# Patient Record
Sex: MALE | URBAN - METROPOLITAN AREA
[De-identification: names, ages, dates, MRNs, and addresses within clinical notes are randomized per-mention and may not be internally consistent; named-entity substitution may affect disease eponyms.]

---

## 2021-06-09 PROCEDURE — 88304 TISSUE EXAM BY PATHOLOGIST: CPT | Performed by: PATHOLOGY

## 2021-06-10 ENCOUNTER — LAB REQUISITION (OUTPATIENT)
Dept: LAB | Facility: HOSPITAL | Age: 41
End: 2021-06-10
Payer: COMMERCIAL

## 2021-06-10 DIAGNOSIS — R22.2 LOCALIZED SWELLING, MASS AND LUMP, TRUNK: ICD-10-CM

## 2023-09-18 ENCOUNTER — OFFICE VISIT (OUTPATIENT)
Dept: GASTROENTEROLOGY | Facility: CLINIC | Age: 43
End: 2023-09-18
Payer: COMMERCIAL

## 2023-09-18 VITALS
DIASTOLIC BLOOD PRESSURE: 76 MMHG | SYSTOLIC BLOOD PRESSURE: 124 MMHG | WEIGHT: 190.4 LBS | BODY MASS INDEX: 26.65 KG/M2 | HEIGHT: 71 IN | TEMPERATURE: 97.8 F

## 2023-09-18 DIAGNOSIS — K76.0 HEPATIC STEATOSIS: ICD-10-CM

## 2023-09-18 DIAGNOSIS — R79.89 ABNORMAL LIVER FUNCTION TEST: Primary | ICD-10-CM

## 2023-09-18 DIAGNOSIS — R19.7 DIARRHEA, UNSPECIFIED TYPE: ICD-10-CM

## 2023-09-18 PROCEDURE — 99204 OFFICE O/P NEW MOD 45 MIN: CPT | Performed by: STUDENT IN AN ORGANIZED HEALTH CARE EDUCATION/TRAINING PROGRAM

## 2023-09-18 RX ORDER — MELATONIN
50000 DAILY
COMMUNITY

## 2023-09-18 NOTE — PATIENT INSTRUCTIONS
For patients with fatty liver disease, weight loss through diet and exercise is recommended. To MAINTAIN weight you must use up at least as many calories as you take in. To LOSE weight you must use up more calories than you take in. Start by knowing how many calories you should be eating and drinking to maintain your weight. Nutrition and calorie information on food labels is typically based on a 2,000 calorie diet. You may need fewer or more calories depending on several factors including age, gender, and level of physical activity. Increase the amount and intensity of your physical activity to match the number of calories you take in. Aim for at least 150 minutes of moderate physical activity or 75 minutes of vigorous physical activity - or an equal combination of both - each week. Regular physical activity can help you maintain your weight, keep off weight that you lose and help you reach physical and cardiovascular fitness. If it's hard to schedule regular exercise sessions, try aiming for sessions of at least 10 minutes spread throughout the week. As you make daily food choices, base your eating pattern on these recommendations:   Eat a variety of fresh, frozen and canned vegetables and fruits without high-calorie sauces or added salt and sugars. Replace high-calorie foods with fruits and vegetables. Choose fiber-rich whole grains for most grain servings. Choose poultry and fish without skin and prepare them in healthy ways without added saturated and trans fat. If you choose to eat meat, look for the leanest cuts available and prepare them in healthy and delicious ways. Eat a variety of fish at least twice a week, especially fish containing omega-3 fatty acids (for example, salmon, trout and herring). Select fat-free (skim) and low-fat (1%) dairy products. Avoid foods containing partially hydrogenated vegetable oils to reduce trans fat in your diet.    Limit saturated fat and trans fat and replace them with the better fats, monounsaturated and polyunsaturated. If you need to lower your blood cholesterol, reduce saturated fat to no more than 5 to 6 percent of total calories. For someone eating 2,000 calories a day, that's about 13 grams of saturated fat. Cut back on beverages and foods with added sugars. Choose foods with less sodium and prepare foods with little or no salt. To prevent fluid problems in the legs and abdomen aim to eat no more than 2,000 milligrams of sodium per day. If you can't meet these goals right now, even reducing sodium intake by 1,000 mg per day can benefit blood pressure. If you drink alcohol, drink in moderation. That means no more than one drink per day if you're a woman and no more than two drinks per day if you're a man. Please note that the effect of alcohol on NAFLD is unclear. Some studies show that moderate alcohol use may be beneficial, but others show no benefit and even harm. Discuss specific recommendations with your hepatologist.   Studies have shown that drinking up to 4 cups of caffeinated coffee per day may reduce progression of liver disease and development of liver cancer. If you have cirrhosis of the liver avoid raw shellfish such as oysters and clams. These can carry a specific bacteria that can be very harmful in liver disease    For more information on Fatty Liver Disease and Prevention please see this video from the [address] [place]: [employer]     For more information on healthy eating, please visit the American Heart Association website: TicketTuesday.uy. jsp     For tips on physical activity, please visit the American Heart Association website:   ConstitutionJournal.co.uk     If you are looking for additional local support, education or are ready to take action to end liver disease, contact the [employer]. The jail is the nation's leading nonprofit focusing on providing high-quality education and support services for the prevention, treatment and cure of over 100 liver diseases. You can learn more by visiting https://whelan.net/. org/midatl[place]/    Patient will schedule US Elastography and US Abdomen complete

## 2023-09-18 NOTE — PROGRESS NOTES
[facility] - Outpatient Consultation  Austyn Olmos 44 y.o. male MRN: [id number]  Encounter: [id number]    PCP:  Scottie Green, [address] [facility], [phone]  Referring Provider:  No ref. provider found,     Patient: Austyn Olmos, 1980  Reason for Referral: abnormal liver tests     ASSESSMENT/PLAN:  44 y.o. male with HLD who presents for initial evaluation for abnormal liver tests. He was incidentally noted to have have mildly elevated serum transaminases with peak  for work-up of epigastric pain and loose stools. He had a limited US which showed hepatomegaly and severe hepatic steatosis. He denies excessive EtOH consumption and recreational drug use. He reports recent weight gain and has a strong family history of premature CAD. I suspect that he has NAFLD given his metabolic risk factors and recent weight gain but would also evaluate for celiac disease given his abdominal complaints and rash. Will send broad serologic work-up as well as stool testing to exclude infection, pancreatic insufficiency and IBD. Will also order US elastography to stage fibrosis. Other possibilities include DILI given recent wellbutrin use. We discussed the natural history, prognosis, and complications of NAFLD, including progression to cirrhosis as well as risk for cardiovascular events. We discussed that maintaining a lean body weight as well as aggressive modification of his metabolic risk factors are estevez in preventing progression of disease. He will return to clinic in 3 months or sooner if needed. Would also recommend GI referral for follow up on his abdominal complaints pending the results of the above work-up. Thank you for the opportunity to consult in his care.      - HAV IgG, HBsAg, HBcAB, HBsAb, and HCV Ab  - MALCOLM, ASMA, AMA and quantitative immunoglobulins   - Celiac disease panel  - Ceruloplasmin  - A1AT levels and phenotype  - Iron levels with ferritin  - ESR/CRP and fecal calprotectin  - Stool pathogen panel, CDiff, O&P  - Fecal elastase  - US abdomen with elastography     Gretchen Cabrera MD  Division of Gastroenterology and Hepatology  [address]    ============================================================================  CC/HPI: 44 y.o. male with HLD who presents for initial evaluation for abnormal liver tests. He was seen by his PCP recently for dizziness for the past 8 weeks as well as epigastric pain and loose stools. He reports greasy appearance of his stools with steatorrhea. Also reports new rash on his R leg. He denies family history of liver and autoimmune disease but has a strong family history of premature CAD. He denies EtOH use and denies recreational drug use. He was started on Wellbutrin for several months but self-discontinued. He recently gained 20 lbs over the last few months after quitting tobacco.     ROS: Complete review of systems otherwise negative. PAST MEDICAL/SURGICAL HISTORY:  Past Medical History:   Diagnosis Date   • Hyperlipidemia       History reviewed. No pertinent surgical history. FAMILY/SOCIAL HISTORY:  Family History   Problem Relation Age of Onset   • Diabetes Father    • Breast cancer Maternal Grandmother        Social History     Tobacco Use   • Smoking status: Former     Types: Cigarettes   Substance Use Topics   • Alcohol use: Not Currently   • Drug use: Not Currently       MEDICATIONS:  Current Outpatient Medications on File Prior to Visit   Medication Sig Dispense Refill   • cholecalciferol (VITAMIN D3) 1,000 units tablet Take 50,000 Units by mouth daily       No current facility-administered medications on file prior to visit.      No Known Allergies    PHYSICAL EXAM:  /76 (BP Location: Left arm, Patient Position: Sitting, Cuff Size: Adult)   Temp 97.8 °F (36.6 °C) (Tympanic)   Ht 5' 11" (1.803 m)   Wt 86.4 kg (190 lb 6.4 oz)   BMI 26.56 kg/m²   GENERAL: NAD, AAO  HEENT: anicteric, OP clear, MMM  ABDOMEN: S/ND/NT, normoactive BS, no hepatomegaly, spleen not palpable  EXTREMITIES: no edema  SKIN: macular rash on R shin, no palmar erythema, no spider angiomata   NEURO: normal gait, no tremor, no asterixis     LABS/RADIOLOGY/ENDOSCOPY:  No results found for: "WBC", "HGB", "HCT", "PLT", "LABGLUC", "GLUF", "BUN", "CREATININE", "NA", "K", "CL", "CO2", "PROT", "BILIDIR", "ALKPHOS", "ALT", "AST", "GLOB", "CALCIUM", "EGFR", "CHOL", "TRIG", "HDL", "LDL", "CALC", "PT", "INR", "PTT", "GGT"    9/8/2023  141/4.6/106/24/11/0.99<102  , AST 56, ALP 83, Tb 0.4, Alb 4.6  A1c 5.9    US abdomen (9/2023)  1. No acute findings. The gallbladder and common bile duct are intact. 2. Marked hepatic steatosis and hepatomegaly. Computed MELD 3.0 unavailable. Necessary lab results were not found in the last year. Computed MELD-Na unavailable. Necessary lab results were not found in the last year.

## 2023-09-28 ENCOUNTER — HOSPITAL ENCOUNTER (OUTPATIENT)
Dept: ULTRASOUND IMAGING | Facility: HOSPITAL | Age: 43
End: 2023-09-28
Attending: STUDENT IN AN ORGANIZED HEALTH CARE EDUCATION/TRAINING PROGRAM
Payer: COMMERCIAL

## 2023-09-28 ENCOUNTER — APPOINTMENT (OUTPATIENT)
Dept: LAB | Facility: HOSPITAL | Age: 43
End: 2023-09-28
Attending: STUDENT IN AN ORGANIZED HEALTH CARE EDUCATION/TRAINING PROGRAM
Payer: COMMERCIAL

## 2023-09-28 DIAGNOSIS — K76.0 HEPATIC STEATOSIS: ICD-10-CM

## 2023-09-28 DIAGNOSIS — R79.89 ABNORMAL LIVER FUNCTION TEST: ICD-10-CM

## 2023-09-28 DIAGNOSIS — R19.7 DIARRHEA, UNSPECIFIED TYPE: ICD-10-CM

## 2023-09-28 LAB
ANA SER QL IA: NEGATIVE
ERYTHROCYTE [SEDIMENTATION RATE] IN BLOOD: 10 MM/HOUR (ref 0–14)
FERRITIN SERPL-MCNC: 160 NG/ML (ref 24–336)
IGA SERPL-MCNC: 297 MG/DL (ref 66–433)
IGG SERPL-MCNC: 771 MG/DL (ref 635–1741)
IGM SERPL-MCNC: 153 MG/DL (ref 45–281)
IRON SATN MFR SERPL: 35 % (ref 15–50)
IRON SERPL-MCNC: 146 UG/DL (ref 50–212)
TIBC SERPL-MCNC: 422 UG/DL (ref 250–450)
UIBC SERPL-MCNC: 276 UG/DL (ref 155–355)

## 2023-09-28 PROCEDURE — 82728 ASSAY OF FERRITIN: CPT

## 2023-09-28 PROCEDURE — 82784 ASSAY IGA/IGD/IGG/IGM EACH: CPT

## 2023-09-28 PROCEDURE — 86364 TISS TRNSGLTMNASE EA IG CLAS: CPT

## 2023-09-28 PROCEDURE — 83540 ASSAY OF IRON: CPT

## 2023-09-28 PROCEDURE — 82104 ALPHA-1-ANTITRYPSIN PHENO: CPT

## 2023-09-28 PROCEDURE — 85652 RBC SED RATE AUTOMATED: CPT

## 2023-09-28 PROCEDURE — 83550 IRON BINDING TEST: CPT

## 2023-09-28 PROCEDURE — 86381 MITOCHONDRIAL ANTIBODY EACH: CPT

## 2023-09-28 PROCEDURE — 76981 USE PARENCHYMA: CPT

## 2023-09-28 PROCEDURE — 82103 ALPHA-1-ANTITRYPSIN TOTAL: CPT

## 2023-09-28 PROCEDURE — 86015 ACTIN ANTIBODY EACH: CPT

## 2023-09-28 PROCEDURE — 36415 COLL VENOUS BLD VENIPUNCTURE: CPT

## 2023-09-28 PROCEDURE — 86038 ANTINUCLEAR ANTIBODIES: CPT

## 2023-09-28 PROCEDURE — 86258 DGP ANTIBODY EACH IG CLASS: CPT

## 2023-09-28 PROCEDURE — 86231 EMA EACH IG CLASS: CPT

## 2023-09-28 PROCEDURE — 82390 ASSAY OF CERULOPLASMIN: CPT

## 2023-09-28 PROCEDURE — 76700 US EXAM ABDOM COMPLETE: CPT

## 2023-09-29 LAB
A1AT SERPL-MCNC: 127 MG/DL (ref 101–187)
ACTIN IGG SERPL-ACNC: 3 UNITS (ref 0–19)
CERULOPLASMIN SERPL-MCNC: 24.1 MG/DL (ref 16–31)
MITOCHONDRIA M2 IGG SER-ACNC: <20 UNITS (ref 0–20)

## 2023-09-30 LAB
ENDOMYSIUM IGA SER QL: NEGATIVE
GLIADIN PEPTIDE IGA SER-ACNC: 21 UNITS (ref 0–19)
GLIADIN PEPTIDE IGG SER-ACNC: 5 UNITS (ref 0–19)
IGA SERPL-MCNC: 316 MG/DL (ref 90–386)
TTG IGA SER-ACNC: <2 U/ML (ref 0–3)
TTG IGG SER-ACNC: <2 U/ML (ref 0–5)

## 2023-10-04 ENCOUNTER — TELEPHONE (OUTPATIENT)
Dept: GASTROENTEROLOGY | Facility: CLINIC | Age: 43
End: 2023-10-04

## 2023-10-04 NOTE — TELEPHONE ENCOUNTER
Spoke with pt's wife Martine Harada, she will take pt to lab to have hep labs drawn by Friday.   All questions and concerns addressed to her satisfaction

## 2023-10-04 NOTE — RESULT ENCOUNTER NOTE
Spoke with Yens at the [address] [place] [address] lab. They will not be able to add the hepatitis panel onto the labs from 9/28 as they do not have the samples.

## 2023-10-04 NOTE — TELEPHONE ENCOUNTER
----- Message from Geovanna Lipscomb MD sent at 10/4/2023  2:44 PM EDT -----  Can we please let the patient know to complete these labs when he's getting blood work again?    ----- Message -----  From: Mago Mccann RN  Sent: 10/4/2023   2:32 PM EDT  To: Geovanna Lipscomb MD    I spoke with Ynes at the [address] [facility] lab. They are not able to add the hepatitis panel form 9/18 to the labs drawn on 9/28. They no longer have the specimens. Ana Zavala  ----- Message -----  From: Geovanna Lipscomb MD  Sent: 10/4/2023   2:26 PM EDT  To: [address] can we please call the lab to see if the hepatitis panel form 9/18 can be added onto this labs from 9/28?

## 2023-10-05 LAB
A1AT PHENOTYP SERPL IFE: NORMAL
A1AT SERPL-MCNC: 125 MG/DL (ref 101–187)

## 2023-10-06 ENCOUNTER — TELEPHONE (OUTPATIENT)
Age: 43
End: 2023-10-06

## 2023-10-06 NOTE — TELEPHONE ENCOUNTER
Patients GI provider:  Dr. Lee Rinaldi    Number to return call: [phone] OPT 1    Reason for call: Ovidio Carr from [address] [place] [address] called in wanting to provide and FYI to Dr. Howard Friends that they cancelled the stool orders as it looks like the pt went to the [facility] to have them completed.      Scheduled procedure/appointment date if applicable: Appt  1/0/23

## 2023-10-17 DIAGNOSIS — Z11.4 SCREENING FOR HIV (HUMAN IMMUNODEFICIENCY VIRUS): Primary | ICD-10-CM

## 2023-10-23 ENCOUNTER — OFFICE VISIT (OUTPATIENT)
Dept: FAMILY MEDICINE CLINIC | Facility: CLINIC | Age: 43
End: 2023-10-23
Payer: COMMERCIAL

## 2023-10-23 ENCOUNTER — APPOINTMENT (OUTPATIENT)
Dept: LAB | Facility: CLINIC | Age: 43
End: 2023-10-23
Payer: COMMERCIAL

## 2023-10-23 VITALS
DIASTOLIC BLOOD PRESSURE: 76 MMHG | SYSTOLIC BLOOD PRESSURE: 120 MMHG | OXYGEN SATURATION: 97 % | TEMPERATURE: 98.2 F | BODY MASS INDEX: 25.96 KG/M2 | HEART RATE: 60 BPM | HEIGHT: 71 IN | WEIGHT: 185.4 LBS

## 2023-10-23 DIAGNOSIS — Z13.1 SCREENING FOR DIABETES MELLITUS: ICD-10-CM

## 2023-10-23 DIAGNOSIS — Z11.4 SCREENING FOR HIV (HUMAN IMMUNODEFICIENCY VIRUS): ICD-10-CM

## 2023-10-23 DIAGNOSIS — Z23 ENCOUNTER FOR IMMUNIZATION: ICD-10-CM

## 2023-10-23 DIAGNOSIS — R79.89 ABNORMAL LIVER FUNCTION TEST: ICD-10-CM

## 2023-10-23 DIAGNOSIS — K76.0 NONALCOHOLIC FATTY LIVER DISEASE: ICD-10-CM

## 2023-10-23 DIAGNOSIS — Z13.0 SCREENING FOR DEFICIENCY ANEMIA: ICD-10-CM

## 2023-10-23 DIAGNOSIS — E78.00 PURE HYPERCHOLESTEROLEMIA: ICD-10-CM

## 2023-10-23 DIAGNOSIS — F41.1 GENERALIZED ANXIETY DISORDER: ICD-10-CM

## 2023-10-23 DIAGNOSIS — Z13.29 SCREENING FOR THYROID DISORDER: ICD-10-CM

## 2023-10-23 DIAGNOSIS — Z76.89 ENCOUNTER TO ESTABLISH CARE: Primary | ICD-10-CM

## 2023-10-23 PROBLEM — R16.0 ENLARGED LIVER: Status: ACTIVE | Noted: 2023-10-23

## 2023-10-23 PROBLEM — G47.00 INSOMNIA: Status: ACTIVE | Noted: 2017-12-04

## 2023-10-23 PROBLEM — J30.2 SEASONAL ALLERGIES: Status: ACTIVE | Noted: 2022-04-08

## 2023-10-23 LAB
HAV AB SER QL IA: NORMAL
HBV CORE AB SER QL: NORMAL
HBV SURFACE AB SER-ACNC: <3 MIU/ML
HBV SURFACE AG SER QL: NORMAL
HCV AB SER QL: NORMAL
HIV 1+2 AB+HIV1 P24 AG SERPL QL IA: NORMAL
HIV 2 AB SERPL QL IA: NORMAL
HIV1 AB SERPL QL IA: NORMAL
HIV1 P24 AG SERPL QL IA: NORMAL
TSH SERPL DL<=0.05 MIU/L-ACNC: 0.67 UIU/ML (ref 0.45–4.5)

## 2023-10-23 PROCEDURE — 36415 COLL VENOUS BLD VENIPUNCTURE: CPT

## 2023-10-23 PROCEDURE — 86803 HEPATITIS C AB TEST: CPT

## 2023-10-23 PROCEDURE — 86704 HEP B CORE ANTIBODY TOTAL: CPT

## 2023-10-23 PROCEDURE — 87389 HIV-1 AG W/HIV-1&-2 AB AG IA: CPT

## 2023-10-23 PROCEDURE — 99204 OFFICE O/P NEW MOD 45 MIN: CPT | Performed by: NURSE PRACTITIONER

## 2023-10-23 PROCEDURE — 86706 HEP B SURFACE ANTIBODY: CPT

## 2023-10-23 PROCEDURE — 84443 ASSAY THYROID STIM HORMONE: CPT

## 2023-10-23 PROCEDURE — 86708 HEPATITIS A ANTIBODY: CPT

## 2023-10-23 PROCEDURE — 87340 HEPATITIS B SURFACE AG IA: CPT

## 2023-10-23 RX ORDER — BUPROPION HYDROCHLORIDE 150 MG/1
150 TABLET ORAL DAILY
COMMUNITY
Start: 2023-10-18 | End: 2023-10-23 | Stop reason: SDUPTHER

## 2023-10-23 RX ORDER — MAGNESIUM CARB/ALUMINUM HYDROX 105-160MG
240 TABLET,CHEWABLE ORAL ONCE
COMMUNITY

## 2023-10-23 RX ORDER — MULTIVIT WITH MINERALS/LUTEIN
1000 TABLET ORAL DAILY
COMMUNITY

## 2023-10-23 RX ORDER — BUPROPION HYDROCHLORIDE 150 MG/1
150 TABLET ORAL DAILY
Qty: 90 TABLET | Refills: 3 | Status: SHIPPED | OUTPATIENT
Start: 2023-10-23

## 2023-10-23 RX ORDER — ATORVASTATIN CALCIUM 10 MG/1
10 TABLET, FILM COATED ORAL DAILY
Qty: 90 TABLET | Refills: 1 | Status: SHIPPED | OUTPATIENT
Start: 2023-10-23

## 2023-10-23 NOTE — PATIENT INSTRUCTIONS
You may start Coenzyme Q10 200 mg daily to help with any aches or pains from statin type cholesterol medication.

## 2023-10-23 NOTE — PROGRESS NOTES
Assessment/Plan:    Problem List Items Addressed This Visit     Generalized anxiety disorder    Relevant Medications    buPROPion (WELLBUTRIN XL) 150 mg 24 hr tablet    Other Relevant Orders    Ambulatory referral to Psych Services    Nonalcoholic fatty liver disease    Pure hypercholesterolemia    Relevant Medications    atorvastatin (LIPITOR) 10 mg tablet   Other Visit Diagnoses     Encounter to establish care    -  Primary    Screening for HIV (human immunodeficiency virus)        Relevant Orders    HIV 1/2 AG/AB w Reflex [facility] for 2 yr old and above    Encounter for immunization        Screening for thyroid disorder        Relevant Orders    TSH, 3rd generation with Free T4 reflex    Screening for diabetes mellitus        Screening for deficiency anemia               Diagnoses and all orders for this visit:    Encounter to establish care    Screening for HIV (human immunodeficiency virus)  -     HIV 1/2 AG/AB w Reflex [facility] for 2 yr old and above; Future    Encounter for immunization    Screening for thyroid disorder  -     TSH, 3rd generation with Free T4 reflex; Future    Screening for diabetes mellitus    Screening for deficiency anemia    Generalized anxiety disorder  -     buPROPion (WELLBUTRIN XL) 150 mg 24 hr tablet; Take 1 tablet (150 mg total) by mouth in the morning  -     Ambulatory referral to [employer]; Future    Pure hypercholesterolemia  -     atorvastatin (LIPITOR) 10 mg tablet; Take 1 tablet (10 mg total) by mouth daily    Nonalcoholic fatty liver disease    Other orders  -     Discontinue: buPROPion (WELLBUTRIN XL) 150 mg 24 hr tablet; Take 150 mg by mouth in the morning  -     Ascorbic Acid (vitamin C) 1000 MG tablet; Take 1,000 mg by mouth daily  -     magnesium citrate (CITROMA) 1.745 g/30 mL oral solution; Take 240 mL by mouth once        No problem-specific Assessment & Plan notes found for this encounter. Subjective:      Patient ID: Karen Huggins is a 44 y.o. male.     NP with PMHx NAFLD, RUBEN, HLD and seasonal allergies presents to establish care. Pt reports bupropion is effective however he does have bouts of increased anxiety. Historically, there was a trial with increasing bupropion however the anxiety was worse. S.O. reports Pt has a therapist which he occasionally sees. Pt Dr. Sravanthi Acuna for management of NAFLD and note pending hepatitis panels. CBC CMP and lipid were reviewed with patient today. Significant other states patient was taking a cholesterol medication in the past however they do not recall the name. Patient does report he had some myalgia with medication. He is agreeable to trialing Lipitor 10 mg we will follow-up with patient in 1 month to review remaining serology and AWE. Hyperlipidemia  This is a chronic problem. The problem is uncontrolled. Recent lipid tests were reviewed and are high. Exacerbating diseases include liver disease. There are no known factors aggravating his hyperlipidemia. Current antihyperlipidemic treatment includes statins (commense). Risk factors for coronary artery disease include dyslipidemia, male sex and family history. The following portions of the patient's history were reviewed and updated as appropriate:   He has a past medical history of Hyperlipidemia.,  does not have any pertinent problems on file. ,   has no past surgical history on file. ,  family history includes Breast cancer in his maternal grandmother; Cancer in his maternal grandmother; Diabetes in his father. ,   reports that he has quit smoking. His smoking use included cigarettes. He has a 50.00 pack-year smoking history. He has been exposed to tobacco smoke. He has never used smokeless tobacco. He reports that he does not currently use alcohol. He reports that he does not currently use drugs. ,  has No Known Allergies. .  Current Outpatient Medications   Medication Sig Dispense Refill   • Ascorbic Acid (vitamin C) 1000 MG tablet Take 1,000 mg by mouth daily     • atorvastatin (LIPITOR) 10 mg tablet Take 1 tablet (10 mg total) by mouth daily 90 tablet 1   • buPROPion (WELLBUTRIN XL) 150 mg 24 hr tablet Take 1 tablet (150 mg total) by mouth in the morning 90 tablet 3   • cholecalciferol (VITAMIN D3) 1,000 units tablet Take 50,000 Units by mouth daily     • magnesium citrate (CITROMA) 1.745 g/30 mL oral solution Take 240 mL by mouth once       No current facility-administered medications for this visit. BMI Counseling: Body mass index is 25.86 kg/m². The BMI is above normal. Nutrition recommendations include decreasing portion sizes, encouraging healthy choices of fruits and vegetables, decreasing fast food intake, consuming healthier snacks, limiting drinks that contain sugar, moderation in carbohydrate intake, increasing intake of lean protein, reducing intake of saturated and trans fat and reducing intake of cholesterol. Exercise recommendations include exercising 3-5 times per week. No pharmacotherapy was ordered. Rationale for BMI follow-up plan is due to patient being overweight or obese. Depression Screening and Follow-up Plan: Patient was screened for depression during today's encounter. They screened negative with a PHQ-2 score of 0. Review of Systems   Psychiatric/Behavioral:  The patient is nervous/anxious. All other systems reviewed and are negative. Objective:  Vitals:    10/23/23 0657   BP: 120/76   Pulse: 60   Temp: 98.2 °F (36.8 °C)   TempSrc: Tympanic   SpO2: 97%   Weight: 84.1 kg (185 lb 6.4 oz)   Height: 5' 11" (1.803 m)     Body mass index is 25.86 kg/m². Physical Exam  Vitals and nursing note reviewed. Constitutional:       Appearance: Normal appearance. He is well-developed. HENT:      Head: Normocephalic and atraumatic.       Right Ear: Tympanic membrane, ear canal and external ear normal.      Left Ear: Tympanic membrane, ear canal and external ear normal.      Nose: Nose normal.      Mouth/Throat:      Mouth: Mucous membranes are moist.      Pharynx: Uvula midline. Eyes:      General: Lids are normal.      Conjunctiva/sclera: Conjunctivae normal.      Pupils: Pupils are equal, round, and reactive to light. Neck:      Thyroid: No thyroid mass. Vascular: No JVD. Trachea: Trachea and phonation normal.   Cardiovascular:      Rate and Rhythm: Normal rate and regular rhythm. Pulses: Normal pulses. Heart sounds: Normal heart sounds, S1 normal and S2 normal. No murmur heard. No friction rub. No gallop. Pulmonary:      Effort: Pulmonary effort is normal.      Breath sounds: Normal breath sounds. Abdominal:      General: Bowel sounds are normal.      Palpations: Abdomen is soft. Tenderness: There is no abdominal tenderness. Genitourinary:     Comments: Deferred  Musculoskeletal:         General: Normal range of motion. Cervical back: Full passive range of motion without pain, normal range of motion and neck supple. Right lower leg: No edema. Left lower leg: No edema. Lymphadenopathy:      Head:      Right side of head: No submental, submandibular, tonsillar, preauricular, posterior auricular or occipital adenopathy. Left side of head: No submental, submandibular, tonsillar, preauricular, posterior auricular or occipital adenopathy. Cervical: No cervical adenopathy. Skin:     General: Skin is warm and dry. Capillary Refill: Capillary refill takes less than 2 seconds. Neurological:      General: No focal deficit present. Mental Status: He is alert and oriented to person, place, and time. Sensory: Sensation is intact. Motor: Motor function is intact. Coordination: Coordination is intact. Gait: Gait is intact. Psychiatric:         Attention and Perception: Attention and perception normal.         Mood and Affect: Mood and affect normal.         Speech: Speech normal.         Behavior: Behavior normal. Behavior is cooperative.          Thought Content: Thought content normal.         Cognition and Memory: Cognition normal.         Judgment: Judgment normal.           Portions of the record may have been created with voice recognition software. Occasional wrong word or "sound a like" substitutions may have occurred due to the inherent limitations of voice recognition software. Read the chart carefully and recognize, using context, where substitutions have occurred. Contact me with any questions.

## 2023-11-02 ENCOUNTER — TELEPHONE (OUTPATIENT)
Dept: GASTROENTEROLOGY | Facility: CLINIC | Age: 43
End: 2023-11-02

## 2023-11-02 NOTE — TELEPHONE ENCOUNTER
----- Message from Tiny Dill MD sent at 11/2/2023  2:52 PM EDT -----  Can we please contact the patient about my unread [facility]?

## 2025-02-08 ENCOUNTER — APPOINTMENT (OUTPATIENT)
Dept: RADIOLOGY | Facility: CLINIC | Age: 45
End: 2025-02-08
Payer: COMMERCIAL

## 2025-02-08 ENCOUNTER — OFFICE VISIT (OUTPATIENT)
Dept: URGENT CARE | Facility: CLINIC | Age: 45
End: 2025-02-08
Payer: COMMERCIAL

## 2025-02-08 VITALS
TEMPERATURE: 97.7 F | HEART RATE: 100 BPM | RESPIRATION RATE: 18 BRPM | SYSTOLIC BLOOD PRESSURE: 138 MMHG | DIASTOLIC BLOOD PRESSURE: 91 MMHG | OXYGEN SATURATION: 95 %

## 2025-02-08 DIAGNOSIS — R05.1 ACUTE COUGH: ICD-10-CM

## 2025-02-08 DIAGNOSIS — R05.1 ACUTE COUGH: Primary | ICD-10-CM

## 2025-02-08 PROCEDURE — 99213 OFFICE O/P EST LOW 20 MIN: CPT | Performed by: NURSE PRACTITIONER

## 2025-02-08 PROCEDURE — 87636 SARSCOV2 & INF A&B AMP PRB: CPT | Performed by: NURSE PRACTITIONER

## 2025-02-08 PROCEDURE — 71046 X-RAY EXAM CHEST 2 VIEWS: CPT

## 2025-02-08 RX ORDER — AZITHROMYCIN 250 MG/1
TABLET, FILM COATED ORAL
Qty: 6 TABLET | Refills: 0 | Status: SHIPPED | OUTPATIENT
Start: 2025-02-08 | End: 2025-02-12

## 2025-02-08 RX ORDER — BENZONATATE 200 MG/1
200 CAPSULE ORAL 3 TIMES DAILY PRN
Qty: 20 CAPSULE | Refills: 0 | Status: SHIPPED | OUTPATIENT
Start: 2025-02-08

## 2025-02-08 NOTE — PROGRESS NOTES
[facility]        NAME: Nolan Cline is a 45 y.o. male  : 1980    MRN: [id number]  DATE: 2025  TIME: 3:12 PM    Assessment and Plan   Acute cough [R05.1]  1. Acute cough  Covid/Flu- Office Collect Normal    Covid/Flu- Office Collect Normal    XR chest pa and lateral    azithromycin (ZITHROMAX) 250 mg tablet    benzonatate (TESSALON) 200 MG capsule            Patient Instructions       Patient Instructions   Take medication as directed.  Rest and drink plenty of fluids. A cool mist humidifier can be helpful.  If you develop a worsening cough, chest pain, shortness of breath, palpitations, coughing up blood, prolonged high fever, any new or concerning symptoms please return or proceed ER.  Recommend following up with PCP in 3-5 days          If tests have been performed at [facility], our office will contact you with results if changes need to be made to the care plan discussed with you at the visit.  You can review your full results on [facility].    Chief Complaint     Chief Complaint   Patient presents with    Cough     Cough, sore throat, chills x3 days. Headache on and off. Productive cough, did vomit from coughing. Has hx of pneumonia. O2 95% currently. Decreased appetite. SOB during coughing attacks.          History of Present Illness       Cough  This is a new problem. Episode onset: 3 days ago. The problem has been gradually worsening. The problem occurs every few minutes. The cough is Productive of sputum. Associated symptoms include chills, myalgias, nasal congestion, rhinorrhea and a sore throat. Pertinent negatives include no chest pain, ear pain, fever, headaches, postnasal drip, rash, shortness of breath, sweats or wheezing. Nothing aggravates the symptoms. He has tried nothing for the symptoms. The treatment provided no relief. His past medical history is significant for pneumonia.       Review of Systems   Review of Systems   Constitutional:  Positive for  chills. Negative for diaphoresis, fatigue and fever.   HENT:  Positive for congestion, rhinorrhea and sore throat. Negative for ear pain, facial swelling, mouth sores, postnasal drip, sinus pressure, sinus pain and trouble swallowing.    Eyes:  Negative for photophobia and visual disturbance.   Respiratory:  Positive for cough. Negative for chest tightness, shortness of breath, wheezing and stridor.    Cardiovascular:  Negative for chest pain and palpitations.   Gastrointestinal:  Negative for abdominal pain, diarrhea, nausea and vomiting.   Genitourinary: Negative.    Musculoskeletal:  Positive for myalgias. Negative for arthralgias, back pain, joint swelling, neck pain and neck stiffness.   Skin:  Negative for rash.   Neurological:  Negative for dizziness, facial asymmetry, weakness, light-headedness, numbness and headaches.         Current Medications       Current Outpatient Medications:     ASHWAGANDHA PO, Take by mouth, Disp: , Rfl:     azithromycin (ZITHROMAX) 250 mg tablet, Take 2 tablets today then 1 tablet daily x 4 days, Disp: 6 tablet, Rfl: 0    benzonatate (TESSALON) 200 MG capsule, Take 1 capsule (200 mg total) by mouth 3 (three) times a day as needed for cough, Disp: 20 capsule, Rfl: 0    cholecalciferol (VITAMIN D3) 1,000 units tablet, Take 50,000 Units by mouth daily, Disp: , Rfl:     ELDERBERRY PO, Take by mouth, Disp: , Rfl:     Levocetirizine Dihydrochloride (XYZAL PO), Take by mouth, Disp: , Rfl:     magnesium citrate (CITROMA) 1.745 g/30 mL oral solution, Take 240 mL by mouth once, Disp: , Rfl:     NON FORMULARY, Lion jesse suuplement, Disp: , Rfl:     Ascorbic Acid (vitamin C) 1000 MG tablet, Take 1,000 mg by mouth daily (Patient not taking: Reported on 2/8/2025), Disp: , Rfl:     atorvastatin (LIPITOR) 10 mg tablet, Take 1 tablet (10 mg total) by mouth daily (Patient not taking: Reported on 2/8/2025), Disp: 90 tablet, Rfl: 1    buPROPion (WELLBUTRIN XL) 150 mg 24 hr tablet, Take 1 tablet (150  mg total) by mouth in the morning (Patient not taking: Reported on 2/8/2025), Disp: 90 tablet, Rfl: 3    Current Allergies     Allergies as of 02/08/2025    (No Known Allergies)            The following portions of the patient's history were reviewed and updated as appropriate: allergies, current medications, past family history, past medical history, past social history, past surgical history and problem list.     Past Medical History:   Diagnosis Date    Hyperlipidemia        History reviewed. No pertinent surgical history.    Family History   Problem Relation Age of Onset    Diabetes Father     Breast cancer Maternal Grandmother     Cancer Maternal Grandmother          Medications have been verified.        Objective   /91   Pulse 100   Temp 97.7 °F (36.5 °C)   Resp 18   SpO2 95%   No LMP for male patient.       Physical Exam     Physical Exam  Constitutional:       General: He is not in acute distress.     Appearance: He is well-developed.   HENT:      Head: Normocephalic and atraumatic.      Right Ear: Hearing, tympanic membrane, ear canal and external ear normal.      Left Ear: Hearing, tympanic membrane, ear canal and external ear normal.      Nose: Congestion and rhinorrhea present. No mucosal edema.      Right Sinus: No maxillary sinus tenderness or frontal sinus tenderness.      Left Sinus: No maxillary sinus tenderness or frontal sinus tenderness.      Mouth/Throat:      Mouth: Mucous membranes are moist.      Pharynx: Oropharynx is clear. Uvula midline. No oropharyngeal exudate or posterior oropharyngeal erythema.      Tonsils: No tonsillar exudate or tonsillar abscesses. 1+ on the right. 1+ on the left.   Cardiovascular:      Rate and Rhythm: Normal rate and regular rhythm.      Heart sounds: Normal heart sounds, S1 normal and S2 normal.   Pulmonary:      Effort: Pulmonary effort is normal.      Breath sounds: Normal air entry. Examination of the right-lower field reveals decreased breath  sounds. Decreased breath sounds present.   Lymphadenopathy:      Cervical: No cervical adenopathy.   Skin:     General: Skin is warm and dry.      Capillary Refill: Capillary refill takes less than 2 seconds.   Neurological:      Mental Status: He is alert and oriented to person, place, and time.

## 2025-02-09 LAB
FLUAV RNA RESP QL NAA+PROBE: NEGATIVE
FLUBV RNA RESP QL NAA+PROBE: NEGATIVE
SARS-COV-2 RNA RESP QL NAA+PROBE: NEGATIVE